# Patient Record
Sex: FEMALE | Race: WHITE
[De-identification: names, ages, dates, MRNs, and addresses within clinical notes are randomized per-mention and may not be internally consistent; named-entity substitution may affect disease eponyms.]

---

## 2018-04-02 ENCOUNTER — HOSPITAL ENCOUNTER (EMERGENCY)
Dept: HOSPITAL 17 - PHED | Age: 73
Discharge: HOME | End: 2018-04-02
Payer: COMMERCIAL

## 2018-04-02 VITALS
RESPIRATION RATE: 18 BRPM | OXYGEN SATURATION: 96 % | SYSTOLIC BLOOD PRESSURE: 228 MMHG | TEMPERATURE: 97.6 F | DIASTOLIC BLOOD PRESSURE: 95 MMHG | HEART RATE: 72 BPM

## 2018-04-02 VITALS — BODY MASS INDEX: 35.68 KG/M2 | WEIGHT: 222.01 LBS | HEIGHT: 66 IN

## 2018-04-02 VITALS — SYSTOLIC BLOOD PRESSURE: 173 MMHG | DIASTOLIC BLOOD PRESSURE: 82 MMHG

## 2018-04-02 VITALS — OXYGEN SATURATION: 97 %

## 2018-04-02 VITALS — SYSTOLIC BLOOD PRESSURE: 170 MMHG | DIASTOLIC BLOOD PRESSURE: 76 MMHG

## 2018-04-02 DIAGNOSIS — R42: Primary | ICD-10-CM

## 2018-04-02 DIAGNOSIS — R53.1: ICD-10-CM

## 2018-04-02 DIAGNOSIS — R03.0: ICD-10-CM

## 2018-04-02 DIAGNOSIS — R94.31: ICD-10-CM

## 2018-04-02 LAB
ALBUMIN SERPL-MCNC: 3.7 GM/DL (ref 3.4–5)
ALP SERPL-CCNC: 116 U/L (ref 45–117)
ALT SERPL-CCNC: 50 U/L (ref 10–53)
AMORPHOUS SEDIMENT, URINE: (no result)
AST SERPL-CCNC: 45 U/L (ref 15–37)
BASOPHILS # BLD AUTO: 0.1 TH/MM3 (ref 0–0.2)
BASOPHILS NFR BLD: 1.3 % (ref 0–2)
BILIRUB SERPL-MCNC: 0.4 MG/DL (ref 0.2–1)
BUN SERPL-MCNC: 15 MG/DL (ref 7–18)
CALCIUM SERPL-MCNC: 8.7 MG/DL (ref 8.5–10.1)
CHLORIDE SERPL-SCNC: 104 MEQ/L (ref 98–107)
COLOR UR: YELLOW
CREAT SERPL-MCNC: 0.77 MG/DL (ref 0.5–1)
EOSINOPHIL # BLD: 0 TH/MM3 (ref 0–0.4)
EOSINOPHIL NFR BLD: 0.2 % (ref 0–4)
ERYTHROCYTE [DISTWIDTH] IN BLOOD BY AUTOMATED COUNT: 13.8 % (ref 11.6–17.2)
GFR SERPLBLD BASED ON 1.73 SQ M-ARVRAT: 73 ML/MIN (ref 89–?)
GLUCOSE SERPL-MCNC: 130 MG/DL (ref 74–106)
GLUCOSE UR STRIP-MCNC: (no result) MG/DL
HCO3 BLD-SCNC: 22.7 MEQ/L (ref 21–32)
HCT VFR BLD CALC: 39.4 % (ref 35–46)
HGB BLD-MCNC: 13.3 GM/DL (ref 11.6–15.3)
HGB UR QL STRIP: (no result)
INR PPP: 1 RATIO
KETONES UR STRIP-MCNC: (no result) MG/DL
LEUKOCYTE ESTERASE UR QL STRIP: (no result) /HPF (ref 0–5)
LYMPHOCYTES # BLD AUTO: 1.5 TH/MM3 (ref 1–4.8)
LYMPHOCYTES NFR BLD AUTO: 17.3 % (ref 9–44)
MAGNESIUM SERPL-MCNC: 2.2 MG/DL (ref 1.5–2.5)
MCH RBC QN AUTO: 29.4 PG (ref 27–34)
MCHC RBC AUTO-ENTMCNC: 33.7 % (ref 32–36)
MCV RBC AUTO: 87.3 FL (ref 80–100)
MONOCYTE #: 0.3 TH/MM3 (ref 0–0.9)
MONOCYTES NFR BLD: 3 % (ref 0–8)
NEUTROPHILS # BLD AUTO: 6.8 TH/MM3 (ref 1.8–7.7)
NEUTROPHILS NFR BLD AUTO: 78.2 % (ref 16–70)
NITRITE UR QL STRIP: (no result)
PLATELET # BLD: 297 TH/MM3 (ref 150–450)
PMV BLD AUTO: 8.1 FL (ref 7–11)
PROT SERPL-MCNC: 8 GM/DL (ref 6.4–8.2)
PROTHROMBIN TIME: 9.9 SEC (ref 9.8–11.6)
RBC # BLD AUTO: 4.52 MIL/MM3 (ref 4–5.3)
SODIUM SERPL-SCNC: 135 MEQ/L (ref 136–145)
SP GR UR STRIP: 1.01 (ref 1–1.03)
SQUAMOUS #/AREA URNS HPF: (no result) /HPF (ref 0–5)
TROPONIN I SERPL-MCNC: (no result) NG/ML (ref 0.02–0.05)
URINE LEUKOCYTE ESTERASE: (no result)
WBC # BLD AUTO: 8.7 TH/MM3 (ref 4–11)

## 2018-04-02 PROCEDURE — 85730 THROMBOPLASTIN TIME PARTIAL: CPT

## 2018-04-02 PROCEDURE — 82552 ASSAY OF CPK IN BLOOD: CPT

## 2018-04-02 PROCEDURE — 82550 ASSAY OF CK (CPK): CPT

## 2018-04-02 PROCEDURE — 85610 PROTHROMBIN TIME: CPT

## 2018-04-02 PROCEDURE — 93005 ELECTROCARDIOGRAM TRACING: CPT

## 2018-04-02 PROCEDURE — 85025 COMPLETE CBC W/AUTO DIFF WBC: CPT

## 2018-04-02 PROCEDURE — 99284 EMERGENCY DEPT VISIT MOD MDM: CPT

## 2018-04-02 PROCEDURE — 83735 ASSAY OF MAGNESIUM: CPT

## 2018-04-02 PROCEDURE — 70450 CT HEAD/BRAIN W/O DYE: CPT

## 2018-04-02 PROCEDURE — 96374 THER/PROPH/DIAG INJ IV PUSH: CPT

## 2018-04-02 PROCEDURE — 81001 URINALYSIS AUTO W/SCOPE: CPT

## 2018-04-02 PROCEDURE — 80053 COMPREHEN METABOLIC PANEL: CPT

## 2018-04-02 PROCEDURE — 84484 ASSAY OF TROPONIN QUANT: CPT

## 2018-04-02 NOTE — PD
HPI


Chief Complaint:  Dizziness


Time Seen by Provider:  12:32


Travel History


International Travel<30 days:  No


Contact w/Intl Traveler<30days:  No


Traveled to known affect area:  No





History of Present Illness


HPI


72-year-old female patient presents to the ER today for dizziness that started 

at 6 AM.  She has been nauseous, feels generally weak.  She denies any fevers, 

chest pains, trouble breathing, or other symptoms.  Symptoms seems to worsen 

when she tries to  something or bends her head down.  She denies any 

focal numbness or weakness.  She denies any previous episodes.





Modifying Factors: None


Associated Signs & Symptoms: Dizziness, nausea, general weakness


Risk Factors: None





PFSH


Social History


Tobacco Use:  No





Allergies-Medications


(Allergen,Severity, Reaction):  


Coded Allergies:  


     No Known Allergies (Unverified , 4/2/18)


Reported Meds & Prescriptions





Reported Meds & Active Scripts


Active


Meclizine (Meclizine HCl) 25 Mg Tab 25 Mg PO TID PRN


Zofran Odt (Ondansetron Odt) 4 Mg Tab 4 Mg SL Q6HR PRN








Review of Systems


Except as stated in HPI:  all other systems reviewed are Neg





Physical Exam


Narrative


GENERAL: Well-developed elderly white female patient currently and moderate 

distress.  Awake and oriented 3.


SKIN: Focused skin assessment warm/dry.


HEAD: Atraumatic. Normocephalic. 


EYES: Pupils equal and round and reactive to light bilaterally. No scleral 

icterus. No injection or drainage.  Extraocular movements are intact.


ENT: No nasal bleeding or discharge.  Mucous membranes pink and moist.


NECK: Trachea midline. No JVD.  Supple.


CARDIOVASCULAR: Regular rate and rhythm.  No murmur appreciated.


RESPIRATORY: No accessory muscle use. Clear to auscultation. Breath sounds 

equal bilaterally. 


GASTROINTESTINAL: Abdomen soft, non-tender, nondistended. Hepatic and splenic 

margins not palpable. 


MUSCULOSKELETAL: No obvious deformities. No clubbing.  No cyanosis.  No edema. 


NEUROLOGICAL: Awake and alert. No obvious cranial nerve deficits.  Motor 

grossly within normal limits. Normal speech.


PSYCHIATRIC: Appropriate mood and affect; insight and judgment normal.





Data


Data


Last Documented VS





Vital Signs








  Date Time  Temp Pulse Resp B/P (MAP) Pulse Ox O2 Delivery O2 Flow Rate FiO2


 


4/2/18 14:06    173/82 (112)    


 


4/2/18 13:00   20     


 


4/2/18 12:23 97.6 72   96   








Orders





 Orders


Electrocardiogram (4/2/18 12:32)


Complete Blood Count With Diff (4/2/18 12:32)


Comprehensive Metabolic Panel (4/2/18 12:32)


Magnesium (Mg) (4/2/18 12:32)


Ckmb (Isoenzyme) Profile (4/2/18 12:32)


Troponin I (4/2/18 12:32)


Act Partial Throm Time (Ptt) (4/2/18 12:32)


Prothrombin Time / Inr (Pt) (4/2/18 12:32)


Urinalysis - C+S If Indicated (4/2/18 12:32)


Ct Brain W/O Iv Contrast(Rout) (4/2/18 12:32)


Ecg Monitoring (4/2/18 12:32)


Iv Access Insert/Monitor (4/2/18 12:32)


Oximetry (4/2/18 12:32)


Meclizine (Antivert) (4/2/18 12:45)


Ondansetron Inj (Zofran Inj) (4/2/18 12:45)


Sodium Chloride 0.9% Flush (Ns Flush) (4/2/18 12:45)


CKMB (4/2/18 12:45)


CKMB% (4/2/18 12:45)





Labs





Laboratory Tests








Test


  4/2/18


12:45 4/2/18


13:15


 


White Blood Count 8.7 TH/MM3  


 


Red Blood Count 4.52 MIL/MM3  


 


Hemoglobin 13.3 GM/DL  


 


Hematocrit 39.4 %  


 


Mean Corpuscular Volume 87.3 FL  


 


Mean Corpuscular Hemoglobin 29.4 PG  


 


Mean Corpuscular Hemoglobin


Concent 33.7 % 


  


 


 


Red Cell Distribution Width 13.8 %  


 


Platelet Count 297 TH/MM3  


 


Mean Platelet Volume 8.1 FL  


 


Neutrophils (%) (Auto) 78.2 %  


 


Lymphocytes (%) (Auto) 17.3 %  


 


Monocytes (%) (Auto) 3.0 %  


 


Eosinophils (%) (Auto) 0.2 %  


 


Basophils (%) (Auto) 1.3 %  


 


Neutrophils # (Auto) 6.8 TH/MM3  


 


Lymphocytes # (Auto) 1.5 TH/MM3  


 


Monocytes # (Auto) 0.3 TH/MM3  


 


Eosinophils # (Auto) 0.0 TH/MM3  


 


Basophils # (Auto) 0.1 TH/MM3  


 


CBC Comment DIFF FINAL  


 


Differential Comment   


 


Blood Urea Nitrogen 15 MG/DL  


 


Creatinine 0.77 MG/DL  


 


Random Glucose 130 MG/DL  


 


Total Protein 8.0 GM/DL  


 


Albumin 3.7 GM/DL  


 


Calcium Level 8.7 MG/DL  


 


Magnesium Level 2.2 MG/DL  


 


Alkaline Phosphatase 116 U/L  


 


Aspartate Amino Transf


(AST/SGOT) 45 U/L 


  


 


 


Alanine Aminotransferase


(ALT/SGPT) 50 U/L 


  


 


 


Total Bilirubin 0.4 MG/DL  


 


Sodium Level 135 MEQ/L  


 


Potassium Level 4.3 MEQ/L  


 


Chloride Level 104 MEQ/L  


 


Carbon Dioxide Level 22.7 MEQ/L  


 


Anion Gap 8 MEQ/L  


 


Estimat Glomerular Filtration


Rate 73 ML/MIN 


  


 


 


Total Creatine Kinase 123 U/L  


 


Creatine Kinase MB 1.9 NG/ML  


 


Troponin I


  LESS THAN 0.02


NG/ML 


 


 


Prothrombin Time  9.9 SEC 


 


Prothromb Time International


Ratio 


  1.0 RATIO 


 


 


Activated Partial


Thromboplast Time 


  27.1 SEC 


 


 


Urine Collection Type  VOIDED 


 


Urine Color  YELLOW 


 


Urine Turbidity  CLOUDY 


 


Urine pH  7.0 


 


Urine Specific Gravity  1.015 


 


Urine Protein  30 mg/dL 


 


Urine Glucose (UA)  NEG mg/dL 


 


Urine Ketones  NEG mg/dL 


 


Urine Occult Blood  TRACE 


 


Urine Nitrite  NEG 


 


Urine Bilirubin  NEG 


 


Urine Urobilinogen  0.2 MG/DL 


 


Urine Leukocyte Esterase  NEG 


 


Urine WBC  0-2 /hpf 


 


Urine Squamous Epithelial


Cells 


  0-3 /hpf 


 


 


Urine Amorphous Sediment  FEW 


 


Microscopic Urinalysis Comment


  


  CULT NOT


INDICATED











MDM


Medical Decision Making


Medical Screen Exam Complete:  Yes


Emergency Medical Condition:  Yes


Medical Record Reviewed:  Yes


Interpretation(s)


EKG shows NSR, no ST elevation or depression, and no arrhythmias.  No  

significant T-wave inversions.








Laboratory Tests








Test


  4/2/18


12:45 4/2/18


13:15


 


Neutrophils (%) (Auto)


  78.2 %


(16.0-70.0) 


 


 


Random Glucose


  130 MG/DL


() 


 


 


Aspartate Amino Transf


(AST/SGOT) 45 U/L (15-37) 


  


 


 


Sodium Level


  135 MEQ/L


(136-145) 


 


 


Estimat Glomerular Filtration


Rate 73 ML/MIN


(>89) 


 


 


Troponin I


  LESS THAN 0.02


NG/ML 


 


 


Urine Turbidity  CLOUDY (CLEAR) 


 


Urine Protein


  


  30 mg/dL


(NEG-TRACE)








Last 24 hours Impressions








Head CT 4/2/18 7732 Signed





Impressions: 





 Service Date/Time:  Monday, April 2, 2018 12:56 - CONCLUSION:  No evidence of 





 acute intracranial pathology. No masses are identified.     Adolfo Benson MD 








Differential Diagnosis


Hypertensive urgency versus acute intracranial processes versus metabolic 

issues versus dysrhythmias versus benign positional vertigo


Narrative Course


Patient was given Zofran and meclizine in the ER.  Her initial blood pressure 

was fairly elevated.  EKG did not show any signs of significant dysrhythmias or 

ST changes.  CT of the brain was negative for any signs of acute intracranial 

processes.  On reevaluation at 2 PM, the patient is feeling improved and did 

not have any further vomiting episodes in the ER.  Her blood pressure is 

improved on its own as well.  At this point, lab work returns not show any 

significant metabolic issues.  She has no focal neurological deficits.  Suspect 

symptoms secondary to vertigo.  My plan would be to release her with follow-up 

to primary care doctor.  Return for worsening in symptoms as necessary.  The 

plan was discussed with her and she states understanding.  In addition, I have 

notified her regarding her elevated blood pressures here in the ER and this 

will need to be evaluated with primary care doctor as well.





Diagnosis





 Primary Impression:  


 Vertigo


 Additional Impression:  


 High blood pressure


***Med/Other Pt SpecificInfo:  Prescription(s) given


Scripts


Meclizine (Meclizine) 25 Mg Tab


25 MG PO TID Y for VERTIGO, #15 TAB 0 Refills


   Prov: Alisa Jackson MD         4/2/18 


Ondansetron Odt (Zofran Odt) 4 Mg Tab


4 MG SL Q6HR Y for Nausea/Vomiting, #7 TAB 0 Refills


   Prov: Alisa Jackson MD         4/2/18


Disposition:  01 DISCHARGE HOME


Condition:  Stable











Alisa Jackson MD Apr 2, 2018 12:37

## 2018-04-02 NOTE — RADRPT
EXAM DATE/TIME:  04/02/2018 12:56 

 

[facility] COMPARISON:     

No previous studies available for comparison.

 

 

INDICATIONS :     

Weakness and hypertensive this morning.

                      

 

RADIATION DOSE:     

59.24 CTDIvol (mGy) 

 

 

 

MEDICAL HISTORY :     

None  

 

SURGICAL HISTORY :      

None. 

 

ENCOUNTER:      

Initial

 

ACUITY:      

1 day

 

PAIN SCALE:      

0/10

 

LOCATION:        

cranial 

 

TECHNIQUE:     

Multiple contiguous axial images were obtained of the head.  Using automated exposure control and adj
ustment of the mA and/or kV according to patient size, radiation dose was kept as low as reasonably a
chievable to obtain optimal diagnostic quality images.   DICOM format image data is available electro
nically for review and comparison.  

 

FINDINGS:     

 

CEREBRUM:     

The ventricles are normal for age.  No evidence of midline shift, mass lesion, hemorrhage or acute in
farction.  No extra-axial fluid collections are seen.

 

POSTERIOR FOSSA:     

The cerebellum and brainstem are intact.  The 4th ventricle is midline.  The cerebellopontine angle i
s unremarkable.

 

EXTRACRANIAL:     

The visualized portion of the orbits is intact.

 

SKULL:     

The calvaria is intact.  No evidence of skull fracture.

 

CONCLUSION:     

No evidence of acute intracranial pathology. No masses are identified.

 

 

 

 Adolfo Benson MD on April 02, 2018 at 14:08           

Board Certified Radiologist.

 This report was verified electronically.

## 2018-04-03 NOTE — EKG
Date Performed: 04/02/2018       Time Performed: 12:37:49

 

PTAGE:      72 years

 

EKG:      Sinus rhythm 

 

 BORDERLINE LEFT AXIS DEVIATION NONSPECIFIC ST & T-WAVE ABNORMALITY BORDERLINE ECG 

 

NO PREVIOUS TRACING            

 

DOCTOR:   Miguel A Foy  Interpretating Date/Time  04/03/2018 16:08:28